# Patient Record
Sex: FEMALE | Race: WHITE
[De-identification: names, ages, dates, MRNs, and addresses within clinical notes are randomized per-mention and may not be internally consistent; named-entity substitution may affect disease eponyms.]

---

## 2021-05-11 ENCOUNTER — HOSPITAL ENCOUNTER (OUTPATIENT)
Dept: HOSPITAL 95 - ORSCMMR | Age: 56
LOS: 1 days | Discharge: HOME | End: 2021-05-12
Attending: ORTHOPAEDIC SURGERY
Payer: COMMERCIAL

## 2021-05-11 VITALS — HEIGHT: 62 IN | WEIGHT: 190.92 LBS | BODY MASS INDEX: 35.13 KG/M2

## 2021-05-11 DIAGNOSIS — I10: ICD-10-CM

## 2021-05-11 DIAGNOSIS — Z79.899: ICD-10-CM

## 2021-05-11 DIAGNOSIS — E66.9: ICD-10-CM

## 2021-05-11 DIAGNOSIS — M17.12: Primary | ICD-10-CM

## 2021-05-11 DIAGNOSIS — F41.9: ICD-10-CM

## 2021-05-11 PROCEDURE — A9270 NON-COVERED ITEM OR SERVICE: HCPCS

## 2021-05-11 PROCEDURE — C1776 JOINT DEVICE (IMPLANTABLE): HCPCS

## 2021-05-11 PROCEDURE — 8E0Y0CZ ROBOTIC ASSISTED PROCEDURE OF LOWER EXTREMITY, OPEN APPROACH: ICD-10-PCS | Performed by: ORTHOPAEDIC SURGERY

## 2021-05-11 PROCEDURE — 0SRC0JA REPLACEMENT OF RIGHT KNEE JOINT WITH SYNTHETIC SUBSTITUTE, UNCEMENTED, OPEN APPROACH: ICD-10-PCS | Performed by: ORTHOPAEDIC SURGERY

## 2021-05-11 PROCEDURE — 27447 TOTAL KNEE ARTHROPLASTY: CPT

## 2021-05-11 NOTE — NUR
REASSESSED PT PAIN AFTER TYLENOL AND OXYCODONE. SHE STATES HER PAIN IS STILL
AN 8/10. PT REFUSING SECOND OXYCODONE AT THIS TIME.

## 2021-05-11 NOTE — NUR
PT ARRIVED TO UNIT FROM PACU.
A&OX4.  PT ABLE TO WIGGLE TOES AND MOVE LEGS.  DENIES PAIN AT THIS TIME.
MEDICATED PER ORDERS FOR NAUSEA UPON ARRIVAL.  ADMINISTERED TXA PER ORDERS.
ORIENTED TO ROOM.  SPOUSE AT BEDSIDE.  CALL LIGHT IN REACH.

## 2021-05-11 NOTE — NUR
SUMMARY
NO ACUTE CHANGES SINCE ARRIVING TO UNIT FROM PACU.  PT AMBULATED TO RESTROOM
TO VOID AND NOW SITTING IN RECLINER.  AMBULATED TO RECLINER FROM RESTROOM
INDEPENDENTLY; EDUCATED ON SAFETY AND NEED FOR STAFF TO [facility].  PT VERBALIZED
UNDERSTANDING.  PAIN AND NAUSEA CONTROLLED PER EMAR.  PT VOIDED.  CALL LIGHT
IN REACH.

## 2021-05-11 NOTE — NUR
Ambulatory in Day Surgery
History, Chart, Medications and Allergies reviewed before start of
procedure. Lungs clear T/O to Auscultation.
Patient confirms NPO status and agrees with scheduled surgery.
Pre-Op teaching done. Pt verbalizes understanding.

## 2021-05-12 LAB
ANION GAP SERPL CALCULATED.4IONS-SCNC: 9 MMOL/L (ref 6–16)
BASOPHILS # BLD AUTO: 0.02 K/MM3 (ref 0–0.23)
BASOPHILS NFR BLD AUTO: 0 % (ref 0–2)
BUN SERPL-MCNC: 14 MG/DL (ref 8–24)
CALCIUM SERPL-MCNC: 8.9 MG/DL (ref 8.5–10.1)
CHLORIDE SERPL-SCNC: 100 MMOL/L (ref 98–108)
CO2 SERPL-SCNC: 26 MMOL/L (ref 21–32)
CREAT SERPL-MCNC: 0.75 MG/DL (ref 0.4–1)
DEPRECATED RDW RBC AUTO: 46.7 FL (ref 35.1–46.3)
EOSINOPHIL # BLD AUTO: 0 K/MM3 (ref 0–0.68)
EOSINOPHIL NFR BLD AUTO: 0 % (ref 0–6)
ERYTHROCYTE [DISTWIDTH] IN BLOOD BY AUTOMATED COUNT: 14.3 % (ref 11.7–14.2)
GLUCOSE SERPL-MCNC: 177 MG/DL (ref 70–99)
HCT VFR BLD AUTO: 31 % (ref 33–51)
HGB BLD-MCNC: 10.3 G/DL (ref 11.5–16)
IMM GRANULOCYTES # BLD AUTO: 0.06 K/MM3 (ref 0–0.1)
IMM GRANULOCYTES NFR BLD AUTO: 0 % (ref 0–1)
LYMPHOCYTES # BLD AUTO: 1.68 K/MM3 (ref 0.84–5.2)
LYMPHOCYTES NFR BLD AUTO: 12 % (ref 21–46)
MCHC RBC AUTO-ENTMCNC: 33.2 G/DL (ref 31.5–36.5)
MCV RBC AUTO: 89 FL (ref 80–100)
MONOCYTES # BLD AUTO: 0.62 K/MM3 (ref 0.16–1.47)
MONOCYTES NFR BLD AUTO: 5 % (ref 4–13)
NEUTROPHILS # BLD AUTO: 11.34 K/MM3 (ref 1.96–9.15)
NEUTROPHILS NFR BLD AUTO: 83 % (ref 41–73)
NRBC # BLD AUTO: 0 K/MM3 (ref 0–0.02)
NRBC BLD AUTO-RTO: 0 /100 WBC (ref 0–0.2)
PLATELET # BLD AUTO: 465 K/MM3 (ref 150–400)
POTASSIUM SERPL-SCNC: 3.5 MMOL/L (ref 3.5–5.5)
SODIUM SERPL-SCNC: 135 MMOL/L (ref 136–145)

## 2021-05-12 NOTE — NUR
PT AND SPOUSE PROVIDED WITH DISCHARGE TEACHING AND PRINTED MATERIAL, AQUACELL
DRESSING X 1, CALLED IN PRESCRIPTIN FOR ANTIEMETIC TO [facility]. PT AND
SPOUSE STATE UNDERSTANDING OF INSTRUCTIONS. PERIPHERAL IV REMOVED WNL. PT'S
BELONGINGS TRANSFERRED TO AWAITING VEHICLE BY PT'S SPOUSE. PT TRANSFERRED TO
AWAITING VEHICE VIA WHEELCHAIR. WRITTEN PRESCRIPTIONS FOR ANTICOAGULANT AND
ANALGESIA PROVIDED TO PT.

## 2021-08-11 ENCOUNTER — HOSPITAL ENCOUNTER (OUTPATIENT)
Dept: HOSPITAL 95 - LAB SHORT | Age: 56
Discharge: HOME | End: 2021-08-11
Attending: ORTHOPAEDIC SURGERY
Payer: COMMERCIAL

## 2021-08-11 DIAGNOSIS — Z01.812: Primary | ICD-10-CM

## 2021-10-12 ENCOUNTER — HOSPITAL ENCOUNTER (OUTPATIENT)
Dept: HOSPITAL 95 - ORSCMMR | Age: 56
LOS: 1 days | Discharge: HOME | End: 2021-10-13
Attending: ORTHOPAEDIC SURGERY
Payer: COMMERCIAL

## 2021-10-12 VITALS — BODY MASS INDEX: 34.52 KG/M2 | HEIGHT: 62 IN | WEIGHT: 187.61 LBS

## 2021-10-12 DIAGNOSIS — E66.9: ICD-10-CM

## 2021-10-12 DIAGNOSIS — Z79.899: ICD-10-CM

## 2021-10-12 DIAGNOSIS — I10: ICD-10-CM

## 2021-10-12 DIAGNOSIS — M17.11: Primary | ICD-10-CM

## 2021-10-12 DIAGNOSIS — E78.00: ICD-10-CM

## 2021-10-12 DIAGNOSIS — Z79.82: ICD-10-CM

## 2021-10-12 PROCEDURE — 8E0Y0CZ ROBOTIC ASSISTED PROCEDURE OF LOWER EXTREMITY, OPEN APPROACH: ICD-10-PCS | Performed by: ORTHOPAEDIC SURGERY

## 2021-10-12 PROCEDURE — A9270 NON-COVERED ITEM OR SERVICE: HCPCS

## 2021-10-12 PROCEDURE — C1776 JOINT DEVICE (IMPLANTABLE): HCPCS

## 2021-10-12 PROCEDURE — 0SRD0JA REPLACEMENT OF LEFT KNEE JOINT WITH SYNTHETIC SUBSTITUTE, UNCEMENTED, OPEN APPROACH: ICD-10-PCS | Performed by: ORTHOPAEDIC SURGERY

## 2021-10-12 PROCEDURE — 27447 TOTAL KNEE ARTHROPLASTY: CPT

## 2021-10-12 NOTE — NUR
PATIENT ARRIVED TO THE FLOOR EARLIER IN BED, POLAR PACK TO RIGHT KNEE,
DRESSING TO RIGHT KNEE CLEAN/DRY AND INTACT. NO COMPLAINTS OF PAIN AT THAT
TIME. NO SIGNS OR SYMPOTMS ACUTE DISTRESS NOTED. HAS HAD SOME NAUSEA AND HAS
BEEN MEDICATED PER PRN ORDERS. MEDS WERE EFFECTIVE. PATIENT HAS WORKED WITH PT
ALREADY AND DID VERY WELL, SAT UP IN CHAIR AND HAS USED THE BATHROOM.
URINATING WITH NO ISSUES. CALL LIGHT AND WATER IN EASY REACH. ABLE TO MAKE
NEEDS AND WANTS KNOWN. IVF CONTINUE. AAOX4.  VISITED. WILL MONITOR.

## 2021-10-12 NOTE — NUR
Ambulatory in Day Surgery Surgical site prepped with 2% Chlorhexidine cloth
wipe.  Leodan Paws warming gown applied.  History, Chart, Medications and
Allergies reviewed before start of procedure.Lungs clear T/O to Auscultation.
Patient confirms NPO status and agrees with scheduled surgery.  Pre-Op
teaching done. Pt verbalizes understanding.  Patient States Post-Procedure
ride home has been arranged.  Patient reports completing Chlorhexadine shower
X2 prior to admission to hospital.

## 2021-10-13 LAB
ANION GAP SERPL CALCULATED.4IONS-SCNC: 9 MMOL/L (ref 6–16)
BASOPHILS # BLD AUTO: 0.02 K/MM3 (ref 0–0.23)
BASOPHILS NFR BLD AUTO: 0 % (ref 0–2)
BUN SERPL-MCNC: 13 MG/DL (ref 8–24)
CALCIUM SERPL-MCNC: 8.8 MG/DL (ref 8.5–10.1)
CHLORIDE SERPL-SCNC: 103 MMOL/L (ref 98–108)
CO2 SERPL-SCNC: 25 MMOL/L (ref 21–32)
CREAT SERPL-MCNC: 0.73 MG/DL (ref 0.4–1)
DEPRECATED RDW RBC AUTO: 47.2 FL (ref 35.1–46.3)
EOSINOPHIL # BLD AUTO: 0 K/MM3 (ref 0–0.68)
EOSINOPHIL NFR BLD AUTO: 0 % (ref 0–6)
ERYTHROCYTE [DISTWIDTH] IN BLOOD BY AUTOMATED COUNT: 14.6 % (ref 11.7–14.2)
GLUCOSE SERPL-MCNC: 122 MG/DL (ref 70–99)
HCT VFR BLD AUTO: 32 % (ref 33–51)
HGB BLD-MCNC: 10.5 G/DL (ref 11.5–16)
IMM GRANULOCYTES # BLD AUTO: 0.07 K/MM3 (ref 0–0.1)
IMM GRANULOCYTES NFR BLD AUTO: 1 % (ref 0–1)
LYMPHOCYTES # BLD AUTO: 1.99 K/MM3 (ref 0.84–5.2)
LYMPHOCYTES NFR BLD AUTO: 14 % (ref 21–46)
MCHC RBC AUTO-ENTMCNC: 32.8 G/DL (ref 31.5–36.5)
MCV RBC AUTO: 87 FL (ref 80–100)
MONOCYTES # BLD AUTO: 0.79 K/MM3 (ref 0.16–1.47)
MONOCYTES NFR BLD AUTO: 5 % (ref 4–13)
NEUTROPHILS # BLD AUTO: 11.81 K/MM3 (ref 1.96–9.15)
NEUTROPHILS NFR BLD AUTO: 80 % (ref 41–73)
NRBC # BLD AUTO: 0 K/MM3 (ref 0–0.02)
NRBC BLD AUTO-RTO: 0 /100 WBC (ref 0–0.2)
PLATELET # BLD AUTO: 394 K/MM3 (ref 150–400)
POTASSIUM SERPL-SCNC: 3.9 MMOL/L (ref 3.5–5.5)
SODIUM SERPL-SCNC: 137 MMOL/L (ref 136–145)

## 2021-10-13 NOTE — NUR
RESTING WELL IN SUPINE WITH EYES CLOSED, HAS AMBULATED WELL IN ROOM.  PAIN
MANAGED PER MD ORDERS.  PLEASANT AND COOPERATIVE WITH CARE.  ACE WRAP OVER
AQUACEL WITH POLAR RUFUS IN PLACE ON RIGHT KNEE.  DENIES FURTHER NEEDS OR WANTS
AT THIS TIME.  SAFETY MEASURES IN PLACE.  WILL CONTINUE TO MONITOR AND ASSRESS
NEEDS AS THEY ARISE.  WILL GIVE HAND OFF TO ONCOMING SHIFT USING SBAR DURING
BEDSIDE REPORT.

## 2021-10-13 NOTE — NUR
DISCHARGE INSTRUCTIONS GIVEN TO PATIENT AT THIS TIME,  HERE TO PICK
PATIENT UP. DRESSING CLEAN/DRY AND INTACT, IV REMOVED. NO SIGNS OR SYMPTOMS
ACUTE DISTRESS NOTED. NO COMPLAINTS OF PAIN OR NAUSEA AT THIS TIME. PATIENT
LEFT VIA WHEELCHAIR AND STAFF ASSIST.